# Patient Record
Sex: MALE | Race: OTHER | HISPANIC OR LATINO
[De-identification: names, ages, dates, MRNs, and addresses within clinical notes are randomized per-mention and may not be internally consistent; named-entity substitution may affect disease eponyms.]

---

## 2019-03-26 PROBLEM — Z00.00 ENCOUNTER FOR PREVENTIVE HEALTH EXAMINATION: Status: ACTIVE | Noted: 2019-03-26

## 2019-04-01 ENCOUNTER — APPOINTMENT (OUTPATIENT)
Dept: SURGERY | Facility: CLINIC | Age: 49
End: 2019-04-01
Payer: MEDICAID

## 2019-04-01 VITALS
HEART RATE: 88 BPM | BODY MASS INDEX: 33.32 KG/M2 | SYSTOLIC BLOOD PRESSURE: 116 MMHG | WEIGHT: 200 LBS | HEIGHT: 65 IN | DIASTOLIC BLOOD PRESSURE: 78 MMHG | TEMPERATURE: 98.6 F

## 2019-04-01 PROCEDURE — 99204 OFFICE O/P NEW MOD 45 MIN: CPT

## 2019-07-01 ENCOUNTER — OUTPATIENT (OUTPATIENT)
Dept: OUTPATIENT SERVICES | Facility: HOSPITAL | Age: 49
LOS: 1 days | End: 2019-07-01
Payer: MEDICAID

## 2019-07-01 VITALS
WEIGHT: 214.95 LBS | TEMPERATURE: 99 F | SYSTOLIC BLOOD PRESSURE: 110 MMHG | OXYGEN SATURATION: 96 % | HEART RATE: 76 BPM | HEIGHT: 68 IN | RESPIRATION RATE: 18 BRPM | DIASTOLIC BLOOD PRESSURE: 77 MMHG

## 2019-07-01 DIAGNOSIS — K81.9 CHOLECYSTITIS, UNSPECIFIED: ICD-10-CM

## 2019-07-01 DIAGNOSIS — Z01.818 ENCOUNTER FOR OTHER PREPROCEDURAL EXAMINATION: ICD-10-CM

## 2019-07-01 DIAGNOSIS — I10 ESSENTIAL (PRIMARY) HYPERTENSION: ICD-10-CM

## 2019-07-01 LAB — BLD GP AB SCN SERPL QL: SIGNIFICANT CHANGE UP

## 2019-07-01 PROCEDURE — 86850 RBC ANTIBODY SCREEN: CPT

## 2019-07-01 PROCEDURE — G0463: CPT

## 2019-07-01 PROCEDURE — 36415 COLL VENOUS BLD VENIPUNCTURE: CPT

## 2019-07-01 PROCEDURE — 86900 BLOOD TYPING SEROLOGIC ABO: CPT

## 2019-07-01 PROCEDURE — 86901 BLOOD TYPING SEROLOGIC RH(D): CPT

## 2019-07-01 NOTE — H&P PST ADULT - NEGATIVE CARDIOVASCULAR SYMPTOMS
no palpitations/no orthopnea/no paroxysmal nocturnal dyspnea/no dyspnea on exertion/no chest pain/no peripheral edema

## 2019-07-01 NOTE — H&P PST ADULT - NSANTHOSAYNRD_GEN_A_CORE
No. LIA screening performed.  STOP BANG Legend: 0-2 = LOW Risk; 3-4 = INTERMEDIATE Risk; 5-8 = HIGH Risk

## 2019-07-01 NOTE — H&P PST ADULT - HISTORY OF PRESENT ILLNESS
49year old male with pmhx of hypertension, T2DM and hyperlipidemia presents today with c/o RUQ area with radiation to mid back x 4weeks. Patient is here today for presurgical testing for scheduled laparoscopic cholecystectomy with intraoperative cholangiogram, possible open on 7/5/19

## 2019-07-04 ENCOUNTER — TRANSCRIPTION ENCOUNTER (OUTPATIENT)
Age: 49
End: 2019-07-04

## 2019-07-05 ENCOUNTER — RESULT REVIEW (OUTPATIENT)
Age: 49
End: 2019-07-05

## 2019-07-05 ENCOUNTER — OUTPATIENT (OUTPATIENT)
Dept: OUTPATIENT SERVICES | Facility: HOSPITAL | Age: 49
LOS: 1 days | End: 2019-07-05
Payer: MEDICAID

## 2019-07-05 ENCOUNTER — APPOINTMENT (OUTPATIENT)
Dept: SURGERY | Facility: HOSPITAL | Age: 49
End: 2019-07-05

## 2019-07-05 VITALS
SYSTOLIC BLOOD PRESSURE: 134 MMHG | OXYGEN SATURATION: 97 % | HEART RATE: 92 BPM | DIASTOLIC BLOOD PRESSURE: 84 MMHG | RESPIRATION RATE: 14 BRPM | HEIGHT: 68 IN | TEMPERATURE: 98 F | WEIGHT: 214.95 LBS

## 2019-07-05 VITALS
SYSTOLIC BLOOD PRESSURE: 116 MMHG | DIASTOLIC BLOOD PRESSURE: 68 MMHG | TEMPERATURE: 98 F | RESPIRATION RATE: 16 BRPM | HEART RATE: 65 BPM | OXYGEN SATURATION: 97 %

## 2019-07-05 DIAGNOSIS — Z01.818 ENCOUNTER FOR OTHER PREPROCEDURAL EXAMINATION: ICD-10-CM

## 2019-07-05 DIAGNOSIS — K81.9 CHOLECYSTITIS, UNSPECIFIED: ICD-10-CM

## 2019-07-05 LAB
BLD GP AB SCN SERPL QL: SIGNIFICANT CHANGE UP
GLUCOSE BLDC GLUCOMTR-MCNC: 144 MG/DL — HIGH (ref 70–99)
GLUCOSE BLDC GLUCOMTR-MCNC: 155 MG/DL — HIGH (ref 70–99)

## 2019-07-05 PROCEDURE — 86850 RBC ANTIBODY SCREEN: CPT

## 2019-07-05 PROCEDURE — 76000 FLUOROSCOPY <1 HR PHYS/QHP: CPT

## 2019-07-05 PROCEDURE — 86901 BLOOD TYPING SEROLOGIC RH(D): CPT

## 2019-07-05 PROCEDURE — 36415 COLL VENOUS BLD VENIPUNCTURE: CPT

## 2019-07-05 PROCEDURE — 47563 LAPARO CHOLECYSTECTOMY/GRAPH: CPT | Mod: AS

## 2019-07-05 PROCEDURE — 82962 GLUCOSE BLOOD TEST: CPT

## 2019-07-05 PROCEDURE — 47563 LAPARO CHOLECYSTECTOMY/GRAPH: CPT

## 2019-07-05 PROCEDURE — 86900 BLOOD TYPING SEROLOGIC ABO: CPT

## 2019-07-05 PROCEDURE — 88304 TISSUE EXAM BY PATHOLOGIST: CPT | Mod: 26

## 2019-07-05 PROCEDURE — 88304 TISSUE EXAM BY PATHOLOGIST: CPT

## 2019-07-05 RX ORDER — SITAGLIPTIN AND METFORMIN HYDROCHLORIDE 500; 50 MG/1; MG/1
1 TABLET, FILM COATED ORAL
Qty: 0 | Refills: 0 | DISCHARGE

## 2019-07-05 RX ORDER — ROSUVASTATIN CALCIUM 5 MG/1
1 TABLET ORAL
Qty: 0 | Refills: 0 | DISCHARGE

## 2019-07-05 RX ORDER — RAMIPRIL 5 MG
1 CAPSULE ORAL
Qty: 0 | Refills: 0 | DISCHARGE

## 2019-07-05 RX ORDER — ACETAMINOPHEN WITH CODEINE 300MG-30MG
1 TABLET ORAL
Qty: 14 | Refills: 0
Start: 2019-07-05

## 2019-07-05 RX ORDER — SODIUM CHLORIDE 9 MG/ML
3 INJECTION INTRAMUSCULAR; INTRAVENOUS; SUBCUTANEOUS EVERY 8 HOURS
Refills: 0 | Status: DISCONTINUED | OUTPATIENT
Start: 2019-07-05 | End: 2019-07-05

## 2019-07-05 RX ORDER — HYDROMORPHONE HYDROCHLORIDE 2 MG/ML
0.5 INJECTION INTRAMUSCULAR; INTRAVENOUS; SUBCUTANEOUS
Refills: 0 | Status: DISCONTINUED | OUTPATIENT
Start: 2019-07-05 | End: 2019-07-05

## 2019-07-05 RX ORDER — OMEGA-3 ACID ETHYL ESTERS 1 G
1 CAPSULE ORAL
Qty: 0 | Refills: 0 | DISCHARGE

## 2019-07-05 RX ORDER — ONDANSETRON 8 MG/1
4 TABLET, FILM COATED ORAL ONCE
Refills: 0 | Status: DISCONTINUED | OUTPATIENT
Start: 2019-07-05 | End: 2019-07-05

## 2019-07-05 NOTE — ASU DISCHARGE PLAN (ADULT/PEDIATRIC) - ASU DISCHARGE DATE/TIME
Detail Level: Zone Include Pregnancy/Lactation Warning?: No Azithromycin Pregnancy And Lactation Text: This medication is considered safe during pregnancy and is also secreted in breast milk. Azithromycin Counseling:  I discussed with the patient the risks of azithromycin including but not limited to GI upset, allergic reaction, drug rash, diarrhea, and yeast infections. Bactrim Counseling:  I discussed with the patient the risks of sulfa antibiotics including but not limited to GI upset, allergic reaction, drug rash, diarrhea, dizziness, photosensitivity, and yeast infections.  Rarely, more serious reactions can occur including but not limited to aplastic anemia, agranulocytosis, methemoglobinemia, blood dyscrasias, liver or kidney failure, lung infiltrates or desquamative/blistering drug rashes. Bactrim Pregnancy And Lactation Text: This medication is Pregnancy Category D and is known to cause fetal risk.  It is also excreted in breast milk. Birth Control Pills Counseling: Birth Control Pill Counseling: I discussed with the patient the potential side effects of OCPs including but not limited to increased risk of stroke, heart attack, thrombophlebitis, deep venous thrombosis, hepatic adenomas, breast changes, GI upset, headaches, and depression.  The patient verbalized understanding of the proper use and possible adverse effects of OCPs. All of the patient's questions and concerns were addressed. Birth Control Pills Pregnancy And Lactation Text: This medication should be avoided if pregnant and for the first 30 days post-partum. Dapsone Counseling: I discussed with the patient the risks of dapsone including but not limited to hemolytic anemia, agranulocytosis, rashes, methemoglobinemia, kidney failure, peripheral neuropathy, headaches, GI upset, and liver toxicity.  Patients who start dapsone require monitoring including baseline LFTs and weekly CBCs for the first month, then every month thereafter.  The patient verbalized understanding of the proper use and possible adverse effects of dapsone.  All of the patient's questions and concerns were addressed. Dapsone Pregnancy And Lactation Text: This medication is Pregnancy Category C and is not considered safe during pregnancy or breast feeding. Doxycycline Counseling:  Patient counseled regarding possible photosensitivity and increased risk for sunburn.  Patient instructed to avoid sunlight, if possible.  When exposed to sunlight, patients should wear protective clothing, sunglasses, and sunscreen.  The patient was instructed to call the office immediately if the following severe adverse effects occur:  hearing changes, easy bruising/bleeding, severe headache, or vision changes.  The patient verbalized understanding of the proper use and possible adverse effects of doxycycline.  All of the patient's questions and concerns were addressed. Doxycycline Pregnancy And Lactation Text: This medication is Pregnancy Category D and not consider safe during pregnancy. It is also excreted in breast milk but is considered safe for shorter treatment courses. Erythromycin Counseling:  I discussed with the patient the risks of erythromycin including but not limited to GI upset, allergic reaction, drug rash, diarrhea, increase in liver enzymes, and yeast infections. Erythromycin Pregnancy And Lactation Text: This medication is Pregnancy Category B and is considered safe during pregnancy. It is also excreted in breast milk. Isotretinoin Counseling: Patient should get monthly blood tests, not donate blood, not drive at night if vision affected, not share medication, and not undergo elective surgery for 6 months after tx completed. Side effects reviewed, pt to contact office should one occur. Isotretinoin Pregnancy And Lactation Text: This medication is Pregnancy Category X and is considered extremely dangerous during pregnancy. It is unknown if it is excreted in breast milk. High Dose Vitamin A Counseling: Side effects reviewed, pt to contact office should one occur. High Dose Vitamin A Pregnancy And Lactation Text: High dose vitamin A therapy is contraindicated during pregnancy and breast feeding. Minocycline Counseling: Patient advised regarding possible photosensitivity and discoloration of the teeth, skin, lips, tongue and gums.  Patient instructed to avoid sunlight, if possible.  When exposed to sunlight, patients should wear protective clothing, sunglasses, and sunscreen.  The patient was instructed to call the office immediately if the following severe adverse effects occur:  hearing changes, easy bruising/bleeding, severe headache, or vision changes.  The patient verbalized understanding of the proper use and possible adverse effects of minocycline.  All of the patient's questions and concerns were addressed. Minocycline Pregnancy And Lactation Text: This medication is Pregnancy Category D and not consider safe during pregnancy. It is also excreted in breast milk. Spironolactone Counseling: Patient advised regarding risks of diarrhea, abdominal pain, hyperkalemia, birth defects (for female patients), liver toxicity and renal toxicity. The patient may need blood work to monitor liver and kidney function and potassium levels while on therapy. The patient verbalized understanding of the proper use and possible adverse effects of spironolactone.  All of the patient's questions and concerns were addressed. Spironolactone Pregnancy And Lactation Text: This medication can cause feminization of the male fetus and should be avoided during pregnancy. The active metabolite is also found in breast milk. 05-Jul-2019 12:12 Tetracycline Counseling: Patient counseled regarding possible photosensitivity and increased risk for sunburn.  Patient instructed to avoid sunlight, if possible.  When exposed to sunlight, patients should wear protective clothing, sunglasses, and sunscreen.  The patient was instructed to call the office immediately if the following severe adverse effects occur:  hearing changes, easy bruising/bleeding, severe headache, or vision changes.  The patient verbalized understanding of the proper use and possible adverse effects of tetracycline.  All of the patient's questions and concerns were addressed. Patient understands to avoid pregnancy while on therapy due to potential birth defects. Benzoyl Peroxide Counseling: Patient counseled that medicine may cause skin irritation and bleach clothing.  In the event of skin irritation, the patient was advised to reduce the amount of the drug applied or use it less frequently.   The patient verbalized understanding of the proper use and possible adverse effects of benzoyl peroxide.  All of the patient's questions and concerns were addressed. Benzoyl Peroxide Pregnancy And Lactation Text: This medication is Pregnancy Category C. It is unknown if benzoyl peroxide is excreted in breast milk. Topical Retinoid counseling:  Patient advised to apply a pea-sized amount only at bedtime and wait 30 minutes after washing their face before applying.  If too drying, patient may add a non-comedogenic moisturizer. The patient verbalized understanding of the proper use and possible adverse effects of retinoids.  All of the patient's questions and concerns were addressed. Topical Retinoid Pregnancy And Lactation Text: This medication is Pregnancy Category C. It is unknown if this medication is excreted in breast milk. Tazorac Counseling:  Patient advised that medication is irritating and drying.  Patient may need to apply sparingly and wash off after an hour before eventually leaving it on overnight.  The patient verbalized understanding of the proper use and possible adverse effects of tazorac.  All of the patient's questions and concerns were addressed. Tazorac Pregnancy And Lactation Text: This medication is not safe during pregnancy. It is unknown if this medication is excreted in breast milk. 05-Jul-2019 13:15 Topical Clindamycin Counseling: Patient counseled that this medication may cause skin irritation or allergic reactions.  In the event of skin irritation, the patient was advised to reduce the amount of the drug applied or use it less frequently.   The patient verbalized understanding of the proper use and possible adverse effects of clindamycin.  All of the patient's questions and concerns were addressed. Topical Clindamycin Pregnancy And Lactation Text: This medication is Pregnancy Category B and is considered safe during pregnancy. It is unknown if it is excreted in breast milk. Topical Sulfur Applications Counseling: Topical Sulfur Counseling: Patient counseled that this medication may cause skin irritation or allergic reactions.  In the event of skin irritation, the patient was advised to reduce the amount of the drug applied or use it less frequently.   The patient verbalized understanding of the proper use and possible adverse effects of topical sulfur application.  All of the patient's questions and concerns were addressed. Topical Sulfur Applications Pregnancy And Lactation Text: This medication is Pregnancy Category C and has an unknown safety profile during pregnancy. It is unknown if this topical medication is excreted in breast milk.

## 2019-07-05 NOTE — ASU DISCHARGE PLAN (ADULT/PEDIATRIC) - CARE PROVIDER_API CALL
Trever Mcconnell)  Surgery  [address] [facility] [facility] [facility] [address] [place] [address]  Phone: [phone]  Fax: [phone]  Follow Up Time:

## 2019-07-09 PROBLEM — E78.5 HYPERLIPIDEMIA, UNSPECIFIED: Chronic | Status: ACTIVE | Noted: 2019-07-01

## 2019-07-09 PROBLEM — I10 ESSENTIAL (PRIMARY) HYPERTENSION: Chronic | Status: ACTIVE | Noted: 2019-07-01

## 2019-07-09 PROBLEM — E11.9 TYPE 2 DIABETES MELLITUS WITHOUT COMPLICATIONS: Chronic | Status: ACTIVE | Noted: 2019-07-01

## 2019-07-09 LAB — SURGICAL PATHOLOGY STUDY: SIGNIFICANT CHANGE UP

## 2019-07-11 PROBLEM — Z86.39 HISTORY OF DIABETES MELLITUS: Status: RESOLVED | Noted: 2019-07-11 | Resolved: 2019-07-11

## 2019-07-11 PROBLEM — Z78.9 NON-SMOKER: Status: ACTIVE | Noted: 2019-07-11

## 2019-07-11 PROBLEM — K81.9 CHOLECYSTITIS: Status: ACTIVE | Noted: 2019-07-11

## 2019-07-11 PROBLEM — Z86.39 HISTORY OF HIGH CHOLESTEROL: Status: RESOLVED | Noted: 2019-07-11 | Resolved: 2019-07-11

## 2019-07-11 PROBLEM — Z86.79 HISTORY OF HIGH BLOOD PRESSURE: Status: RESOLVED | Noted: 2019-07-11 | Resolved: 2019-07-11

## 2019-07-11 PROBLEM — Z78.9 SOCIAL ALCOHOL USE: Status: ACTIVE | Noted: 2019-07-11

## 2019-07-15 ENCOUNTER — APPOINTMENT (OUTPATIENT)
Dept: SURGERY | Facility: CLINIC | Age: 49
End: 2019-07-15
Payer: MEDICAID

## 2019-07-15 DIAGNOSIS — Z86.79 PERSONAL HISTORY OF OTHER DISEASES OF THE CIRCULATORY SYSTEM: ICD-10-CM

## 2019-07-15 DIAGNOSIS — K81.9 CHOLECYSTITIS, UNSPECIFIED: ICD-10-CM

## 2019-07-15 DIAGNOSIS — Z86.39 PERSONAL HISTORY OF OTHER ENDOCRINE, NUTRITIONAL AND METABOLIC DISEASE: ICD-10-CM

## 2019-07-15 DIAGNOSIS — Z78.9 OTHER SPECIFIED HEALTH STATUS: ICD-10-CM

## 2019-07-15 PROCEDURE — 99024 POSTOP FOLLOW-UP VISIT: CPT

## 2019-07-15 NOTE — HISTORY OF PRESENT ILLNESS
[de-identified] : Patient is s/p laparoscopic  cholecystectomy  on 07/05/2019. Today patient offers no complaints. patient reports no fever, chills,  or  pain.  Surgical wounds are  healing well. No signs of inflammation, infection or exudate. Patient reports good bowel movements and appetite.

## 2019-07-15 NOTE — ASSESSMENT
[FreeTextEntry1] : Patient is doing well, with excellent post-operative recovery. All surgical incisions are healing well and as expected. There is no evidence of infection or complication, and patient is progressing as expected. Post-operative wound care, activity, restrictions and precautions reinforced. Patient instructed to refrain from any heavy lifting greater than 10-15 pounds for at least 4  weeks post-operatively.  path discussed Patient's questions and concerns addressed to patient's satisfaction.\par

## 2019-07-15 NOTE — PHYSICAL EXAM
[Calm] : calm [de-identified] : The patient is alert, well-groomed, and cheerful. [de-identified] : Surgical wounds are  healing well.   no signs of  inflammation or infection.

## 2019-07-15 NOTE — DATA REVIEWED
[FreeTextEntry1] : DAVID FLORES E                       1\par \par \par \par Surgical Final Report\par \par \par \par \par Final Diagnosis\par \par Gallbladder; laparoscopic cholecystectomy:\par - Chronic calculous cholecystitis.\par - Cholesterolosis.\par \par Verified by: Oralia Sipmson MD\par (Electronic Signature)\par Reported on: 07/09/19 10:57 EDT, [address], [place],\[place] [address]\par _________________________________________________________________\par \par Clinical History\par Cholelithiasis\par Operation performed: laparoscopic cholecystectomy with IOC

## 2019-08-29 NOTE — ASU PATIENT PROFILE, ADULT - MEDICATION ADMINISTRATION INFO, PROFILE
no concerns Independent sitting, transfers, standing and ambulation with good balance using appropriate assistive device and prevent falls.

## 2020-01-27 NOTE — H&P PST ADULT - NS PRO TALK SOMEONE YN
-- DO NOT REPLY / DO NOT REPLY ALL --  -- Message is from the [facility]--    General Patient Message      Reason for Call: The patient can not come to see the doctor until he get his blood work done. The patient will see the patient after he get blood work.    Caller Information       Type Contact Phone    01/25/2020 12:19 PM Phone (Incoming) Arthur Barton (Self) [phone] (H)          Alternative phone number: none    Turnaround time given to caller:   \"This message will be sent to [state Provider's name]. The clinical team will fulfill your request as soon as they review your message.\"}    
Labs ordered  
no

## 2023-12-01 NOTE — ASU DISCHARGE PLAN (ADULT/PEDIATRIC) - DISCHARGE TO
I spoke with Angie and she stated that she sent this message to Heean also .  She stated that she put patient back on the schedule and explained to him about Dr. Macias not doing virtual and the pain medication  Protocol.        Also can you have customer service call me?        Fabien YANG [facility] 1 hour ago (6:51 AM)       sounds good, I got [employer] care management involved because of all of the strange rules, they'll help me get it sorted out       Nubia Randolph 1 hour ago (6:50 AM)     NM  This is not common practice for our provider.  I have sent this message to the correct personnel and we will await the insurance phone call.     Thank you       Nubia Roe C [facility] Nurse Msg Pool; Thad Maier RN 1 hour ago (6:49 AM)     NM  This message came to me this morning. Just forwarding it to you all so it can be in file.            ----- Message from Nubia Roe sent at 12/1/2023  6:47 AM CST -----  Regarding: FW: Appointment canceled  Contact: [phone]  This message came to me this morning. Just forwarding it to you all so it can be in file.  ----- Message -----  From: Fabien Randolph  Sent: 11/30/2023   5:31 PM CST  To: [facility]  Subject: Appointment canceled                             Appointment canceled for Fabien Randolph ([id number])  Visit Type: CONSULT  Date        Time      Length    Provider                  Department  12/6/2023    7:00 AM  45 mins.  DO MARION Tamayo [facility]    Reason for Cancellation: Too sick to come in    Patient Comments: Gonna need it virtual, if its just a consult anyway theres no reason why we cant do it virtual. I can hardly get out of bed. MY [employer] care manager who was assigned to me Alana MOE will be contacting you about how to do this virtually       Home

## 2024-07-13 NOTE — ASU PREOP CHECKLIST - NS PREOP CHK MONITOR ANESTHESIA CONSENT
"Procedures - Endoscopy Inpatient  Procedure: Upper endoscopy  Indications: anemia  Therapies/Interventions bipolar probe to treat suspect area of active bleeding; x3 clips placed  Specimens: Collected no  Sedation: Fentanyl 100 mcg; Midazolam 4 mg  Sedation Time: 7 \"  Airway management: nasal cannula 2 liters  Response to procedure: tolerated well  SBAR Post procedure to:    Tete VARELA     Registered Nurse     Since 7/13/2024     Patient transported via litter to and from station 88 by staff Services.    Inge Estevez RN, BSN, CGRN  Endoscopy staff     " done